# Patient Record
Sex: MALE | Race: BLACK OR AFRICAN AMERICAN | URBAN - METROPOLITAN AREA
[De-identification: names, ages, dates, MRNs, and addresses within clinical notes are randomized per-mention and may not be internally consistent; named-entity substitution may affect disease eponyms.]

---

## 2024-11-21 ENCOUNTER — OFFICE VISIT (OUTPATIENT)
Age: 84
End: 2024-11-21

## 2024-11-21 VITALS — HEIGHT: 69 IN | BODY MASS INDEX: 23.7 KG/M2 | WEIGHT: 160 LBS

## 2024-11-21 DIAGNOSIS — M25.561 RIGHT KNEE PAIN, UNSPECIFIED CHRONICITY: Primary | ICD-10-CM

## 2024-11-21 DIAGNOSIS — M17.11 PRIMARY OSTEOARTHRITIS OF RIGHT KNEE: ICD-10-CM

## 2024-11-21 RX ORDER — NITROGLYCERIN 0.4 MG/1
TABLET SUBLINGUAL
COMMUNITY
Start: 2024-01-22

## 2024-11-21 RX ORDER — HYDROXYZINE PAMOATE 25 MG/1
CAPSULE ORAL
COMMUNITY
Start: 2024-04-18

## 2024-11-21 RX ORDER — CETIRIZINE HYDROCHLORIDE 10 MG/1
10 TABLET ORAL DAILY PRN
COMMUNITY
Start: 2024-01-22

## 2024-11-21 RX ORDER — AMLODIPINE BESYLATE 10 MG/1
TABLET ORAL
COMMUNITY
Start: 2024-07-22

## 2024-11-21 RX ORDER — ACETAMINOPHEN 500 MG
500 TABLET ORAL EVERY 6 HOURS PRN
COMMUNITY
Start: 2024-10-21

## 2024-11-21 RX ORDER — TRIAMCINOLONE ACETONIDE 40 MG/ML
40 INJECTION, SUSPENSION INTRA-ARTICULAR; INTRAMUSCULAR ONCE
Status: COMPLETED | OUTPATIENT
Start: 2024-11-21 | End: 2024-11-21

## 2024-11-21 RX ORDER — INDAPAMIDE 2.5 MG/1
TABLET ORAL
COMMUNITY

## 2024-11-21 RX ORDER — CICLOPIROX OLAMINE 7.7 MG/G
CREAM TOPICAL 2 TIMES DAILY
COMMUNITY
Start: 2024-05-22

## 2024-11-21 RX ORDER — LIDOCAINE HYDROCHLORIDE 10 MG/ML
9 INJECTION, SOLUTION INFILTRATION; PERINEURAL ONCE
Status: COMPLETED | OUTPATIENT
Start: 2024-11-21 | End: 2024-11-21

## 2024-11-21 RX ADMIN — LIDOCAINE HYDROCHLORIDE 9 ML: 10 INJECTION, SOLUTION INFILTRATION; PERINEURAL at 11:58

## 2024-11-21 RX ADMIN — TRIAMCINOLONE ACETONIDE 40 MG: 40 INJECTION, SUSPENSION INTRA-ARTICULAR; INTRAMUSCULAR at 11:58

## 2024-11-21 NOTE — PROGRESS NOTES
Name: Bharath Bermudez    : 1940     [facility]  [address], [address]  [place] [address]  Dept: [phone]  Dept Fax: [phone]     Chief Complaint   Patient presents with    Knee Pain     right        Ht 1.753 m (5' 9\")   Wt 72.6 kg (160 lb)   BMI 23.63 kg/m²      No Known Allergies     Current Outpatient Medications   Medication Sig Dispense Refill    acetaminophen (TYLENOL) 500 MG tablet Take 1 tablet by mouth every 6 hours as needed      amLODIPine (NORVASC) 10 MG tablet TAKE 1 TABLET BY MOUTH ONCE DAILY WITH DINNER FOR BLOOD PRESSURE      cetirizine (ZYRTEC) 10 MG tablet Take 1 tablet by mouth daily as needed      ciclopirox (LOPROX) 0.77 % cream Apply topically 2 times daily      hydrOXYzine pamoate (VISTARIL) 25 MG capsule TAKE 1 CAPSULE BY MOUTH NIGHTLY AT BEDTIME AS NEEDED FOR ITCHING FOR UP TO 30 DAYS      nitroGLYCERIN (NITROSTAT) 0.4 MG SL tablet One tab sublingual every 5 min for relief of pain x 3 as necessary.  Report to ER if pain lasts longer than 30 min      indapamide (LOZOL) 2.5 MG tablet TAKE 1 TABLET BY MOUTH EVERY MORNING FOR HIGH POTASSIUM      carvedilol (COREG) 12.5 MG tablet Take by mouth 2 times daily (with meals)      cyanocobalamin 500 MCG tablet Take 500 mcg by mouth daily      pravastatin (PRAVACHOL) 40 MG tablet Take 40 mg by mouth daily      rosuvastatin (CRESTOR) 40 MG tablet Take 40 mg by mouth      valsartan (DIOVAN) 160 MG tablet Take by mouth daily       No current facility-administered medications for this visit.       Patient Active Problem List   Diagnosis    BPH (benign prostatic hyperplasia)    Heart disease    Prostate cancer (HCC)    Stomach cancer (HCC)    Hypertension      Family History   Family history unknown: Yes       Past Surgical History:   Procedure Laterality Date    CHOLECYSTECTOMY      GASTRECTOMY      OTHER SURGICAL HISTORY      heart